# Patient Record
Sex: MALE | Race: WHITE | NOT HISPANIC OR LATINO
[De-identification: names, ages, dates, MRNs, and addresses within clinical notes are randomized per-mention and may not be internally consistent; named-entity substitution may affect disease eponyms.]

---
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
## 2025-02-13 NOTE — ASU PATIENT PROFILE, PEDIATRIC - PATIENT'S HEIGHT AND WEIGHT RECORDED IN THE VITAL SIGNS FLOWSHEET
Per Carmita with [facility], pt reports he is doing well on CPAP and wants to know if CPAP to BiPAP titration is still necessary.     Pt last seen by Dr. Lam on 9/10/2024:    \"CPAP titration between 6/11/2024 - 9/8/2024 on CPAP 15 CWP showed compliance with more than 4-hour usage at 63% and average usage of 5 hours and 12 minutes.  AHI of 17.3/h and central index at 9.7/h.  Patient also developed Cheyne-Hutchins respiration 29% of the time.\"    CPAP to BiPAP titration was ordered to [facility], however, pt requested this be pushed off multiple times per Carmita.     Pt now reports he feels he is doing well on CPAP and wants to know if the CPAP to BiPAP titration still necessary.     Most recent compliance report sent to Dr. Lam.    Msg routed to MD   yes

## 2025-03-04 NOTE — H&P PST PEDIATRIC - AS O2 DELIVERY
What Type Of Note Output Would You Prefer (Optional)?: Bullet Format Hpi Title: Evaluation of Skin Lesions room air